# Patient Record
Sex: FEMALE | Race: WHITE | NOT HISPANIC OR LATINO | Employment: FULL TIME | URBAN - METROPOLITAN AREA
[De-identification: names, ages, dates, MRNs, and addresses within clinical notes are randomized per-mention and may not be internally consistent; named-entity substitution may affect disease eponyms.]

---

## 2018-11-08 ENCOUNTER — OCCUPATIONAL MEDICINE (OUTPATIENT)
Dept: URGENT CARE | Facility: CLINIC | Age: 43
End: 2018-11-08
Payer: COMMERCIAL

## 2018-11-08 VITALS
WEIGHT: 127 LBS | TEMPERATURE: 98.2 F | BODY MASS INDEX: 27.4 KG/M2 | DIASTOLIC BLOOD PRESSURE: 72 MMHG | HEART RATE: 70 BPM | OXYGEN SATURATION: 98 % | RESPIRATION RATE: 16 BRPM | HEIGHT: 57 IN | SYSTOLIC BLOOD PRESSURE: 110 MMHG

## 2018-11-08 DIAGNOSIS — S39.011A ABDOMINAL WALL STRAIN, INITIAL ENCOUNTER: ICD-10-CM

## 2018-11-08 DIAGNOSIS — S39.012A LOW BACK STRAIN, INITIAL ENCOUNTER: ICD-10-CM

## 2018-11-08 PROCEDURE — 99204 OFFICE O/P NEW MOD 45 MIN: CPT | Mod: 29 | Performed by: NURSE PRACTITIONER

## 2018-11-08 ASSESSMENT — ENCOUNTER SYMPTOMS
PALPITATIONS: 0
BACK PAIN: 1
HEARTBURN: 0
BLOOD IN STOOL: 0
ORTHOPNEA: 0
ABDOMINAL PAIN: 0
WEAKNESS: 0
TINGLING: 0
SENSORY CHANGE: 0
CHILLS: 0
NAUSEA: 0
FLANK PAIN: 0
VOMITING: 0
BRUISES/BLEEDS EASILY: 0
SHORTNESS OF BREATH: 0
MYALGIAS: 1
FEVER: 0

## 2018-11-08 NOTE — LETTER
[facility]  [address] [facility], [address] [place] [address]  Phone:  [phone] - Fax:  [phone]   [facility] Progress Report and Disability Certification  Date of Service: 2018   No Show:  No  Date / Time of Next Visit: 2018   Claim Information   Patient Name: Jeanna Bowen  Claim Number:     Employer:   [employer] Date of Injury: 2018     Insurer / TPA: [employer]  ID / SSN:     Occupation:   Diagnosis: Diagnoses of Abdominal wall strain, initial encounter and Low back strain, initial encounter were pertinent to this visit.    Medical Information   Related to Industrial Injury? Yes    Subjective Complaints:  DOI 18. 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 Had pain felt after work while lying down at home. Aleve last night. Denies nausea, vomiting, difficulty with bowel movements or pain/blood with urination. Pain is increased with movement and pain has radiates to her lower back. No previous injury to abdomen or back. No secondary job.   Objective Findings: A/O x 4. Patient holding lower abdomen in exam room. Increased discomfort with changing positions. Decreased range of motion with flexion at lower abdomen and lower back due to discomfort. No bulging seen abdominal wall, no bruising/redness or TTP at lower abdomen. No TTP at lower back. Mild antalgic gait.    Pre-Existing Condition(s):     Assessment:   Initial Visit    Status: Additional Care Required  Permanent Disability:No    Plan:      Diagnostics:      Comments:       Disability Information   Status: Released to Restricted Duty    From:  2018  Through: 2018 Restrictions are: Temporary   Physical Restrictions   Sitting:    Standing:    [place] hrs/day [place] hrs/day   Squatt[place] hrs/day Walking:    Climbing:    Pushing:      Pulling:    Other:    Reaching Above Shoulder (L):   Reaching Above Shoulder (R):      Reaching Below Shoulder (L):    Reaching Below Shoulder (R):      Not to exceed Weight Limits   Carrying(hrs):   Weight Limit(lb): < or = to 10 pounds Lifting(hrs):   Weight  Limit(lb): < or = to 10 pounds   Comments: May use up to 600 mg Ibuprofen as needed for pain, may use ice/heat for throbbing pain/muscle stiffness as needed for pain, may use IcyHot with lidocaine as needed for localized pain relief, may perform small range of motion exercises as needed to prevent muscle stiffness. Recheck on 11/13/18.      Repetitive Actions   Hands: i.e. Fine Manipulations from Grasping:     Feet: i.e. Operating Foot Controls:     Driving / Operate Machinery:     Physician Name: [facility] Physician Signature:   e-Signature: Dr. Reji Echavarria, Medical Director   Clinic Name / Location: [address], [address] [place], [place] [id number] Clinic Phone Number: Dept: [phone]   Appointment Time: 11:15 Am Visit Start Time: 11:27 AM   Check-In Time:  11:18 Am Visit Discharge Time:  11:53 AM   Original-Treating Physician or Chiropractor    Page 2-Insurer/TPA    Page 3-Employer    Page 4-Employee

## 2018-11-13 ENCOUNTER — OCCUPATIONAL MEDICINE (OUTPATIENT)
Dept: URGENT CARE | Facility: CLINIC | Age: 43
End: 2018-11-13
Payer: COMMERCIAL

## 2018-11-13 VITALS
RESPIRATION RATE: 16 BRPM | HEIGHT: 57 IN | WEIGHT: 128 LBS | DIASTOLIC BLOOD PRESSURE: 74 MMHG | SYSTOLIC BLOOD PRESSURE: 108 MMHG | BODY MASS INDEX: 27.61 KG/M2 | TEMPERATURE: 98.7 F | HEART RATE: 82 BPM | OXYGEN SATURATION: 96 %

## 2018-11-13 DIAGNOSIS — S39.012D STRAIN OF LUMBAR REGION, SUBSEQUENT ENCOUNTER: ICD-10-CM

## 2018-11-13 DIAGNOSIS — S39.011D STRAIN OF ABDOMINAL WALL, SUBSEQUENT ENCOUNTER: ICD-10-CM

## 2018-11-13 PROCEDURE — 99213 OFFICE O/P EST LOW 20 MIN: CPT | Mod: 29 | Performed by: NURSE PRACTITIONER

## 2018-11-13 ASSESSMENT — ENCOUNTER SYMPTOMS
BACK PAIN: 0
ABDOMINAL PAIN: 0
FOCAL WEAKNESS: 0
TINGLING: 0
SENSORY CHANGE: 0

## 2018-11-13 NOTE — LETTER
[facility]  [address] [facility], [address] [place] [address]  Phone:  [phone] - Fax:  [phone]   [facility] Progress Report and Disability Certification  Date of Service: 11/13/2018   No Show:  No  Date / Time of Next Visit:     Claim Information   Patient Name: Jeanna Bowen  Claim Number:     Employer:    Date of Injury: 11/7/2018     Insurer / TPA: [employer]  ID / SSN:     Occupation:   Diagnosis: Diagnoses of Strain of lumbar region, subsequent encounter and Strain of abdominal wall, subsequent encounter were pertinent to this visit.    Medical Information   Related to Industrial Injury? Yes    Subjective Complaints:  DOI 11/7/18. 2nd visit.  Patient was initially seen in urgent care for an abdominal wall and low back strain after moving heavy totes/pallets due to conveyor system being down.  Since last visit she reports resolution of symptoms.  NO pain.  Not taking any analgesics.  No previous injury to abdomen or back. No secondary job.      Objective Findings: Patient is alert, oriented, and in no acute distress.  Back and abdomen are without muscle spasm or TTP.  Back ROM intact.  No wincing or guarding.  No gait abnormalities.     Pre-Existing Condition(s):     Assessment:   Condition Improved    Status: Discharged /  MMI  Permanent Disability:No    Plan:      Diagnostics:      Comments:       Disability Information   Status: Released to Full Duty    From:     Through:   Restrictions are:     Physical Restrictions   Sitting:    Standing:    Stooping:    Bending:      Squatting:    Walking:    Climbing:    Pushing:      Pulling:    Other:    Reaching Above Shoulder (L):   Reaching Above Shoulder (R):       Reaching Below Shoulder (L):    Reaching Below Shoulder (R):      Not to exceed Weight Limits   Carrying(hrs):   Weight Limit(lb):   Lifting(hrs):   Weight  Limit(lb):     Comments:      Repetitive Actions   Hands: i.e. Fine  Manipulations from Grasping:     Feet: i.e. Operating Foot Controls:     Driving / Operate Machinery:     Physician Name: NITHIN Dan Physician Signature: [place] Guillen e-Signature: Dr. Reji Echavarria, Medical Director   Clinic Name / Location: [address], [address]  [place] [address] Clinic Phone Number: Dept: [phone]   Appointment Time: 1:30 Pm Visit Start Time: 1:32 PM   Check-In Time:  1:28 Pm Visit Discharge Time: 2:20 PM   Original-Treating Physician or Chiropractor    Page 2-Insurer/TPA    Page 3-Employer    Page 4-Employee

## 2018-11-14 NOTE — PROGRESS NOTES
"Subjective:      Jeanna Bowen is a 43 y.o. female who presents with Abdominal Pain      DOI 11/7/18. 2nd visit.  Patient was initially seen in urgent care for an abdominal wall and low back strain after moving heavy totes/pallets due to conveyor system being down.  Since last visit she reports resolution of symptoms.  NO pain.  Not taking any analgesics.  No previous injury to abdomen or back. No secondary job.        HPI    Review of Systems   Gastrointestinal: Negative for abdominal pain.   Musculoskeletal: Negative for back pain.   Neurological: Negative for tingling, sensory change and focal weakness.     Medications, Allergies, and current problem list reviewed today in Epic     Objective:     /74 (BP Location: Left arm, Patient Position: Sitting, BP Cuff Size: Small adult)   Pulse 82   Temp 37.1 °C (98.7 °F) (Temporal)   Resp 16   Ht 1.448 m (4' 9\")   Wt 58.1 kg (128 lb)   SpO2 96%   BMI 27.70 kg/m²      Physical Exam    Patient is alert, oriented, and in no acute distress.  Back and abdomen are without muscle spasm or TTP.  Back ROM intact.  No wincing or guarding.  No gait abnormalities.         Assessment/Plan:     1. Strain of lumbar region, subsequent encounter    2. Strain of abdominal wall, subsequent encounter    Discharged MMI.  Patient verbalized understanding of and agreed with plan of care.      "

## 2019-10-02 NOTE — PROGRESS NOTES
"Subjective:      Jeanna Bowen is a 43 y.o. female who presents with Abdominal Pain      DOI 11/7/18. 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 Had pain felt after work while lying down at home. Aleve last night. Denies nausea, vomiting, difficulty with bowel movements or pain/blood with urination. Pain is increased with movement and pain has radiates to her lower back. No previous injury to abdomen or back. No secondary job.     HPI  See above  PMH:  has no past medical history on file.  MEDS: No current outpatient prescriptions on file.  ALLERGIES: No Known Allergies  SURGHX: No past surgical history on file.  SOCHX:    FH: Family history was reviewed, no pertinent findings to report    Review of Systems   Constitutional: Negative for chills, fever and malaise/fatigue.   Respiratory: Negative for shortness of breath.    Cardiovascular: Negative for chest pain, palpitations and orthopnea.   Gastrointestinal: Negative for abdominal pain, blood in stool, heartburn, nausea and vomiting.   Genitourinary: Negative for dysuria, flank pain, frequency, hematuria and urgency.   Musculoskeletal: Positive for back pain and myalgias.   Neurological: Negative for tingling, sensory change and weakness.   Endo/Heme/Allergies: Does not bruise/bleed easily.   All other systems reviewed and are negative.         Objective:     /72 (BP Location: Left arm, Patient Position: Sitting, BP Cuff Size: Small infant)   Pulse 70   Temp 36.8 °C (98.2 °F) (Temporal)   Resp 16   Ht 1.448 m (4' 9\")   Wt 57.6 kg (127 lb)   SpO2 98%   BMI 27.48 kg/m²      Physical Exam   Constitutional: She is oriented to person, place, and time. Vital signs are normal. She appears well-developed and well-nourished. She is cooperative.  Non-toxic appearance. She does not have a sickly appearance. She does not appear ill. No distress.   HENT:   Head: Normocephalic.   Eyes: Pupils are equal, round, and reactive to light. " Go immediately to the emergency room   Conjunctivae and EOM are normal.   Neck: Normal range of motion. Neck supple.   Cardiovascular: Normal rate, regular rhythm and normal heart sounds.    Pulmonary/Chest: Effort normal and breath sounds normal. No accessory muscle usage. No respiratory distress. She has no decreased breath sounds. She has no wheezes. She has no rhonchi. She has no rales.   Abdominal: Soft. Bowel sounds are normal. She exhibits no distension, no fluid wave and no ascites. There is no tenderness. There is no rigidity, no rebound, no guarding and no CVA tenderness.   Musculoskeletal: Normal range of motion.        Lumbar back: She exhibits pain. She exhibits normal range of motion, no tenderness, no bony tenderness, no swelling, no edema, no deformity and no spasm.   Neurological: She is alert and oriented to person, place, and time.   Skin: Skin is warm and dry. She is not diaphoretic.   Vitals reviewed.      A/O x 4. Patient holding lower abdomen in exam room. Increased discomfort with changing positions. Decreased range of motion with flexion at lower abdomen and lower back due to discomfort. No bulging seen abdominal wall, no bruising/redness or TTP at lower abdomen. No TTP at lower back. Mild antalgic gait.        Assessment/Plan:     1. Abdominal wall strain, initial encounter      2. Low back strain, initial encounter    May use up to 600 mg Ibuprofen as needed for pain, may use ice/heat for throbbing pain/muscle stiffness as needed for pain, may use IcyHot with lidocaine as needed for localized pain relief, may perform small range of motion exercises as needed to prevent muscle stiffness. Recheck on 11/13/18.